# Patient Record
Sex: MALE
[De-identification: names, ages, dates, MRNs, and addresses within clinical notes are randomized per-mention and may not be internally consistent; named-entity substitution may affect disease eponyms.]

---

## 2019-03-11 PROBLEM — Z00.00 ENCOUNTER FOR PREVENTIVE HEALTH EXAMINATION: Status: ACTIVE | Noted: 2019-03-11

## 2019-03-25 ENCOUNTER — FORM ENCOUNTER (OUTPATIENT)
Age: 24
End: 2019-03-25

## 2019-03-26 ENCOUNTER — APPOINTMENT (OUTPATIENT)
Dept: ORTHOPEDIC SURGERY | Facility: CLINIC | Age: 24
End: 2019-03-26
Payer: COMMERCIAL

## 2019-03-26 ENCOUNTER — OUTPATIENT (OUTPATIENT)
Dept: OUTPATIENT SERVICES | Facility: HOSPITAL | Age: 24
LOS: 1 days | End: 2019-03-26
Payer: COMMERCIAL

## 2019-03-26 ENCOUNTER — APPOINTMENT (OUTPATIENT)
Age: 24
End: 2019-03-26

## 2019-03-26 VITALS — HEIGHT: 70 IN | BODY MASS INDEX: 23.62 KG/M2 | WEIGHT: 165 LBS

## 2019-03-26 PROCEDURE — 99204 OFFICE O/P NEW MOD 45 MIN: CPT

## 2019-03-26 PROCEDURE — 73050 X-RAY EXAM OF SHOULDERS: CPT

## 2019-03-26 PROCEDURE — 73050 X-RAY EXAM OF SHOULDERS: CPT | Mod: 26

## 2019-03-26 RX ORDER — LIDOCAINE 5% 700 MG/1
5 PATCH TOPICAL
Refills: 0 | Status: ACTIVE | COMMUNITY

## 2019-04-03 NOTE — CONSULT LETTER
[Dear  ___] : Dear  [unfilled], [FreeTextEntry1] : Today I had the pleasure of evaluating your very nice patient COLLEEN BROWN  who requested that I share my findings with you. I very much appreciate the referral. \par \par Please review my office note below and, needless to say, please call or email me with any questions or concerns.\par \par I appreciate the opportunity to participate in his care.\par \par Sincerely,\par \par Oscar Navarro MD\par Director, Orthopaedic Surgery\par and Orthopaedic [employer] Initiatives \par [facility]\par Office: [phone]\par Cell: [phone]\par Email: [email or web address] Website: [email or web address] \par \par \par \par \par  [DrRamses  ___] : Dr. COX

## 2019-04-03 NOTE — DISCUSSION/SUMMARY
[Medication Risks Reviewed] : Medication risks reviewed [Surgical risks reviewed] : Surgical risks reviewed [de-identified] : I ordered and reviewed radiographs of the right acromioclavicular joint today that show a grade 3 right acromioclavicular joint dislocation with calcifications in the region of the coracoclavicular ligaments just inferior to the clavicle. I explained to Benjamin that 90% of patients with these injuries are successfully treated nonoperatively. Since he remains symptomatic and is unable to work and perform activities of daily living due to 2 persistent right shoulder pain for months following his injury, I believe he is an excellent candidate for ORIF of the right acromioclavicular joint dislocation.\par \par I explained that this procedure is performed under a regional scalene block anesthesia on an ambulatory basis and will require 4-6 months of postoperative rehabilitation before final outcome can be assessed. Since this is a chronic injury greater than 3 months following the injury, he will most likely require a semitendinosus allograft.\par \par The risks, benefits, aftercare precautions, and alternatives of treatment were reviewed with him at all his questions were answered. He will schedule surgical treatment at his convenience.\par \par Today his clinical right shoulder [employer] score is 47 on a scale of 100 indicating marked compromise of shoulder function.

## 2019-04-03 NOTE — PHYSICAL EXAM
[de-identified] : CONSTITUTIONAL: Patient is well-developed, well-nourished and appropriately groomed.\par SKIN: There are no rashes, no ulcers, and no café au lait spots in the upper extremities, face or cervical region.\par CARDIOVASCULAR: Both distal upper extremities are warm and the fingers have good capillary refill. Normal radial pulses bilaterally.\par RESPIRATORY: The patient is breathing normally and in no acute distress. \par HEMATOLOGICAL/LYMPHATIC: There is no lymphedema in either upper extremity. No cervical adenopathy, no axillary adenopathy.\par PSYCH: The patient is alert and oriented x 3;  appropriately groomed and dressed.\par NEUROLOGIC: Normal gait and station.\par MUSCULOSKELETAL:\par The right shoulder has a prominent distal clavicle and the acromioclavicular joint is tender. Passive forward elevation 170°, external rotation 70°, internal rotation to the 10th thoracic vertebra. He has normal strength of external rotation and can fully actively raise the arm but this is painful.\par \par The contralateral shoulder has full, painless active and passive range of motion with 170° forward elevation, 70° external rotation, and internal rotation to the seventh thoracic vertebra. There is no focal tenderness and no crepitus with passive rotation of the glenohumeral joint.\par \par The cervical spine has full passive and active range of motion without pain. There is no focal tenderness in the paracervical muscles nor in the trapezius or parascapular muscles. Distally the motor and sensory exam is normal in both upper extremities.\par \par Distally, the hands are warm and well perfused with no signs of lymphedema or swelling. The radial pulse is present and normal.
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
## 2019-04-10 ENCOUNTER — APPOINTMENT (OUTPATIENT)
Age: 24
End: 2019-04-10
Payer: COMMERCIAL

## 2019-04-10 PROCEDURE — 23550 OPTX ACROMCLV DISLC AQT/CHRN: CPT | Mod: AS,RT

## 2019-04-10 PROCEDURE — 23550 OPTX ACROMCLV DISLC AQT/CHRN: CPT | Mod: RT

## 2019-04-18 ENCOUNTER — FORM ENCOUNTER (OUTPATIENT)
Age: 24
End: 2019-04-18

## 2019-04-19 ENCOUNTER — APPOINTMENT (OUTPATIENT)
Dept: ORTHOPEDIC SURGERY | Facility: CLINIC | Age: 24
End: 2019-04-19
Payer: COMMERCIAL

## 2019-04-19 ENCOUNTER — APPOINTMENT (OUTPATIENT)
Dept: RADIOLOGY | Facility: CLINIC | Age: 24
End: 2019-04-19

## 2019-04-19 ENCOUNTER — OUTPATIENT (OUTPATIENT)
Dept: OUTPATIENT SERVICES | Facility: HOSPITAL | Age: 24
LOS: 1 days | End: 2019-04-19
Payer: COMMERCIAL

## 2019-04-19 VITALS — WEIGHT: 165 LBS | BODY MASS INDEX: 23.62 KG/M2 | RESPIRATION RATE: 16 BRPM | HEIGHT: 70 IN

## 2019-04-19 PROCEDURE — 73050 X-RAY EXAM OF SHOULDERS: CPT | Mod: 26

## 2019-04-19 PROCEDURE — 99024 POSTOP FOLLOW-UP VISIT: CPT

## 2019-04-22 NOTE — DISCUSSION/SUMMARY
[Medication Risks Reviewed] : Medication risks reviewed [Surgical risks reviewed] : Surgical risks reviewed [de-identified] : Doing well 9 days following ORIF of the right acromioclavicular joint dislocation. I instructed him in pendulum exercises and advised him to continue sling immobilization.\par \par I ordered and reviewed radiographs of the right clavicle today that show an anatomic reduction of the acromioclavicular joint.\par \par I will reevaluate him in one month  and repeat radiographs of the acromioclavicular joint at which time range of motion exercises will begin as well as gentle strengthening. Pain and weakness in the right shoulder preclude his ability to work at this time.

## 2019-04-22 NOTE — PHYSICAL EXAM
[de-identified] : The right shoulder incision is healed and the sutures were removed. The acromioclavicular joint is anatomically reduced and distally the neurovascular examination is intact.

## 2019-05-16 ENCOUNTER — FORM ENCOUNTER (OUTPATIENT)
Age: 24
End: 2019-05-16

## 2019-05-17 ENCOUNTER — OUTPATIENT (OUTPATIENT)
Dept: OUTPATIENT SERVICES | Facility: HOSPITAL | Age: 24
LOS: 1 days | End: 2019-05-17
Payer: COMMERCIAL

## 2019-05-17 ENCOUNTER — APPOINTMENT (OUTPATIENT)
Dept: RADIOLOGY | Facility: CLINIC | Age: 24
End: 2019-05-17

## 2019-05-17 ENCOUNTER — APPOINTMENT (OUTPATIENT)
Dept: ORTHOPEDIC SURGERY | Facility: CLINIC | Age: 24
End: 2019-05-17
Payer: COMMERCIAL

## 2019-05-17 PROCEDURE — 73050 X-RAY EXAM OF SHOULDERS: CPT | Mod: 26

## 2019-05-17 PROCEDURE — 99024 POSTOP FOLLOW-UP VISIT: CPT

## 2019-05-17 PROCEDURE — 73050 X-RAY EXAM OF SHOULDERS: CPT

## 2019-05-17 RX ORDER — OXYCODONE AND ACETAMINOPHEN 5; 325 MG/1; MG/1
5-325 TABLET ORAL
Qty: 30 | Refills: 0 | Status: DISCONTINUED | COMMUNITY
Start: 2019-04-09 | End: 2019-05-17

## 2019-05-18 NOTE — DISCUSSION/SUMMARY
[Surgical risks reviewed] : Surgical risks reviewed [Medication Risks Reviewed] : Medication risks reviewed [de-identified] : I ordered and reviewed radiographs of the right acromioclavicular joint today that show the joint is reduced. He is doing very well 5 weeks following ORIF of the right acromioclavicular joint dislocation.\par \par I encouraged him to continue forward elevation stretching and I instructed him in rotator cuff strengthening exercises today. He may discontinue the use of the sling and followup to see me in 6 weeks at which time I will repeat radiographs of the right acromioclavicular joint and clear him to return to full duty at work without restrictions.

## 2019-05-18 NOTE — HISTORY OF PRESENT ILLNESS
[de-identified] : Ms. Wyatt visits us today 1 month following right open reduction internal fixation of right acromioclavicular joint dislocation on 4-10-19

## 2019-05-18 NOTE — CONSULT LETTER
[Dear  ___] : Dear  [unfilled], [FreeTextEntry1] : Today I had the pleasure of evaluating your very nice patient COLLEEN BROWN  who requested that I share my findings with you. I very much appreciate the referral. \par \par Please review my office note below and, needless to say, please call or email me with any questions or concerns.\par \par I appreciate the opportunity to participate in his care.\par \par Sincerely,\par \par Oscar Navarro MD\par Director, Orthopaedic Surgery\par and Orthopaedic [employer] Initiatives \par [facility]\par Office: [phone]\par Cell: [phone]\par Email: [email or web address]\par Website: [email or web address] \par \par \par \par \par

## 2019-05-18 NOTE — REASON FOR VISIT
[Follow-Up Visit] : a follow-up visit for [FreeTextEntry2] : Right Shoulder Pain for Right Shoulder Pain for 5.5 months

## 2019-05-18 NOTE — PHYSICAL EXAM
[de-identified] : The right acromioclavicular joint is reduced. Right shoulder elevation 170° external rotation 60°. He can fully actively raise the arm but this is painful. Strength of external rotation is normal.

## 2019-06-27 ENCOUNTER — FORM ENCOUNTER (OUTPATIENT)
Age: 24
End: 2019-06-27

## 2019-06-28 ENCOUNTER — OUTPATIENT (OUTPATIENT)
Dept: OUTPATIENT SERVICES | Facility: HOSPITAL | Age: 24
LOS: 1 days | End: 2019-06-28
Payer: COMMERCIAL

## 2019-06-28 ENCOUNTER — APPOINTMENT (OUTPATIENT)
Dept: ORTHOPEDIC SURGERY | Facility: CLINIC | Age: 24
End: 2019-06-28
Payer: COMMERCIAL

## 2019-06-28 ENCOUNTER — APPOINTMENT (OUTPATIENT)
Dept: RADIOLOGY | Facility: CLINIC | Age: 24
End: 2019-06-28

## 2019-06-28 VITALS — BODY MASS INDEX: 23.62 KG/M2 | WEIGHT: 165 LBS | HEIGHT: 70 IN

## 2019-06-28 DIAGNOSIS — S43.084A OTHER DISLOCATION OF RIGHT SHOULDER JOINT, INITIAL ENCOUNTER: ICD-10-CM

## 2019-06-28 PROCEDURE — 73050 X-RAY EXAM OF SHOULDERS: CPT

## 2019-06-28 PROCEDURE — 73050 X-RAY EXAM OF SHOULDERS: CPT | Mod: 26

## 2019-06-28 PROCEDURE — 99024 POSTOP FOLLOW-UP VISIT: CPT

## 2019-07-01 NOTE — HISTORY OF PRESENT ILLNESS
[de-identified] : Ms. Wyatt visits us today 11 weeks following right open reduction internal fixation of right acromioclavicular joint dislocation on 4-10-19.

## 2019-07-01 NOTE — DISCUSSION/SUMMARY
[Medication Risks Reviewed] : Medication risks reviewed [Surgical risks reviewed] : Surgical risks reviewed [de-identified] : I ordered and reviewed radiographs of the right shoulder today that show an anatomic reduction of the right acromioclavicular joint.\par \par I reviewed with Benjamin a shoulder strengthening program and gave her my home exercise sheet. I advised him to work on these exercises for 6 weeks and anticipate that he will be able to return to full duty without restriction on 8-15-19.\par \par He should return for my repeat evaluation at anytime in the future if he has recurrent right shoulder pain.

## 2019-07-01 NOTE — PHYSICAL EXAM
[de-identified] : The right shoulder has 170° passive forward elevation, 70° external rotation. He can fully actively raise the arm without pain and has normal strength of external rotation. The clavicle is anatomically reduced to the acromium.